# Patient Record
Sex: MALE | Race: WHITE | NOT HISPANIC OR LATINO | Employment: FULL TIME | URBAN - METROPOLITAN AREA
[De-identification: names, ages, dates, MRNs, and addresses within clinical notes are randomized per-mention and may not be internally consistent; named-entity substitution may affect disease eponyms.]

---

## 2024-06-22 ENCOUNTER — HOSPITAL ENCOUNTER (EMERGENCY)
Facility: HOSPITAL | Age: 54
Discharge: HOME | End: 2024-06-22
Payer: COMMERCIAL

## 2024-06-22 ENCOUNTER — APPOINTMENT (OUTPATIENT)
Dept: RADIOLOGY | Facility: HOSPITAL | Age: 54
End: 2024-06-22
Payer: COMMERCIAL

## 2024-06-22 VITALS
HEART RATE: 91 BPM | RESPIRATION RATE: 18 BRPM | OXYGEN SATURATION: 94 % | TEMPERATURE: 97.2 F | HEIGHT: 76 IN | DIASTOLIC BLOOD PRESSURE: 77 MMHG | BODY MASS INDEX: 32.88 KG/M2 | SYSTOLIC BLOOD PRESSURE: 120 MMHG | WEIGHT: 270 LBS

## 2024-06-22 DIAGNOSIS — U07.1 COVID-19: Primary | ICD-10-CM

## 2024-06-22 LAB
FLUAV RNA RESP QL NAA+PROBE: NOT DETECTED
FLUBV RNA RESP QL NAA+PROBE: NOT DETECTED
SARS-COV-2 RNA RESP QL NAA+PROBE: DETECTED

## 2024-06-22 PROCEDURE — 87502 INFLUENZA DNA AMP PROBE: CPT | Performed by: EMERGENCY MEDICINE

## 2024-06-22 PROCEDURE — 2500000001 HC RX 250 WO HCPCS SELF ADMINISTERED DRUGS (ALT 637 FOR MEDICARE OP): Performed by: PHYSICIAN ASSISTANT

## 2024-06-22 PROCEDURE — 96372 THER/PROPH/DIAG INJ SC/IM: CPT | Performed by: PHYSICIAN ASSISTANT

## 2024-06-22 PROCEDURE — 71046 X-RAY EXAM CHEST 2 VIEWS: CPT | Mod: FOREIGN READ | Performed by: RADIOLOGY

## 2024-06-22 PROCEDURE — 2500000004 HC RX 250 GENERAL PHARMACY W/ HCPCS (ALT 636 FOR OP/ED): Performed by: PHYSICIAN ASSISTANT

## 2024-06-22 PROCEDURE — 99283 EMERGENCY DEPT VISIT LOW MDM: CPT

## 2024-06-22 PROCEDURE — 71046 X-RAY EXAM CHEST 2 VIEWS: CPT

## 2024-06-22 RX ORDER — KETOROLAC TROMETHAMINE 30 MG/ML
30 INJECTION, SOLUTION INTRAMUSCULAR; INTRAVENOUS ONCE
Status: COMPLETED | OUTPATIENT
Start: 2024-06-22 | End: 2024-06-22

## 2024-06-22 RX ORDER — BENZONATATE 100 MG/1
200 CAPSULE ORAL ONCE
Status: COMPLETED | OUTPATIENT
Start: 2024-06-22 | End: 2024-06-22

## 2024-06-22 RX ADMIN — KETOROLAC TROMETHAMINE 30 MG: 30 INJECTION, SOLUTION INTRAMUSCULAR at 18:49

## 2024-06-22 RX ADMIN — BENZONATATE 200 MG: 100 CAPSULE ORAL at 18:50

## 2024-06-22 ASSESSMENT — LIFESTYLE VARIABLES
EVER HAD A DRINK FIRST THING IN THE MORNING TO STEADY YOUR NERVES TO GET RID OF A HANGOVER: NO
TOTAL SCORE: 0
EVER FELT BAD OR GUILTY ABOUT YOUR DRINKING: NO
HAVE YOU EVER FELT YOU SHOULD CUT DOWN ON YOUR DRINKING: NO
HAVE PEOPLE ANNOYED YOU BY CRITICIZING YOUR DRINKING: NO

## 2024-06-22 ASSESSMENT — PAIN SCALES - GENERAL: PAINLEVEL_OUTOF10: 4

## 2024-06-22 ASSESSMENT — COLUMBIA-SUICIDE SEVERITY RATING SCALE - C-SSRS
1. IN THE PAST MONTH, HAVE YOU WISHED YOU WERE DEAD OR WISHED YOU COULD GO TO SLEEP AND NOT WAKE UP?: NO
2. HAVE YOU ACTUALLY HAD ANY THOUGHTS OF KILLING YOURSELF?: NO
6. HAVE YOU EVER DONE ANYTHING, STARTED TO DO ANYTHING, OR PREPARED TO DO ANYTHING TO END YOUR LIFE?: NO

## 2024-06-22 ASSESSMENT — PAIN DESCRIPTION - DIRECTION: RADIATING_TOWARDS: JOINT

## 2024-06-22 ASSESSMENT — PAIN - FUNCTIONAL ASSESSMENT: PAIN_FUNCTIONAL_ASSESSMENT: 0-10

## 2024-06-22 NOTE — Clinical Note
Brock Paige was seen and treated in our emergency department on 6/22/2024.  He may return to work on 06/24/2024.       If you have any questions or concerns, please don't hesitate to call.      Bryce Barron PA-C

## 2024-06-23 NOTE — ED PROVIDER NOTES
HPI   Chief Complaint   Patient presents with    Flu Symptoms     Pt c/o flu symptoms for 2 weeks. Pt has been having fevers, chills, body aches and a cough.       This is a 53-year-old male presenting for evaluation of 4-day history of productive cough with clear sputum, body aches, generalized weakness, upper respiratory congestion, subjective fevers.  His wife traveled recently and had similar symptoms and was treated with antibiotics after testing negative for flu and COVID and have a negative chest x-ray.  Denies shortness of breath, chest pain abdominal pain, nausea, vomiting, diarrhea.      History provided by:  Patient   used: No                        [place] Coma Scale Score: 15                     Patient History   History reviewed. No pertinent past medical history.  History reviewed. No pertinent surgical history.  No family history on file.  Social History     Tobacco Use    Smoking status: Never    Smokeless tobacco: Never   Vaping Use    Vaping status: Never Used   Substance Use Topics    Alcohol use: Never    Drug use: Never       Physical Exam   ED Triage Vitals   Temperature Heart Rate Respirations BP   06/22/24 1738 06/22/24 1738 06/22/24 1738 06/22/24 1738   36 °C (96.8 °F) 99 18 103/70      Pulse Ox Temp Source Heart Rate Source Patient Position   06/22/24 1738 06/22/24 1932 -- --   96 % Temporal        BP Location FiO2 (%)     -- --             Physical Exam    General: Vitals noted, no distress. Afebrile. Normal phonation. No stridor or trismus.   EENT: Posterior oropharynx patent, no exudates or tonsillar swelling. Uvula midline  Neck: No meningismus.  No lymphadenopathy.  Cardiac: Regular rate and rhythm. No murmur.  Pulmonary: Lungs clear bilaterally with good aeration. No adventitious breath sounds.  Abdomen: Soft. Nontender  Extremities: [place] normally  Skin: No rash  Neuro: Alert and oriented    ED Course & MDM   Diagnoses as of 06/22/24 2228   COVID-19       Medical  Decision Making  Patient appears well.  Nontoxic.  Stable hemodynamically.  Unremarkable ENT exam.  Lungs and chest are clear to auscultation.  Positive for COVID.  Chest x-ray shows no acute focal consolidation or pneumothorax per my independent review.  Advised supportive measures, push fluids, OTC analgesics/antipyretics.  Instructed to return to the nearest ED if any concerns or new or worsening symptoms. Patient verbalized understanding and agreement with plan. Discharged in stable condition.      Disclaimer: This note was dictated using speech recognition software. An attempt at proofreading was made to minimize errors. Minor errors in transcription may be present. Please call if questions.    Amount and/or Complexity of Data Reviewed  Labs: ordered.  Radiology: ordered.        Procedure  Procedures     Bryce Barron PA-C  06/22/24 5499